# Patient Record
Sex: FEMALE
[De-identification: names, ages, dates, MRNs, and addresses within clinical notes are randomized per-mention and may not be internally consistent; named-entity substitution may affect disease eponyms.]

---

## 2019-03-14 ENCOUNTER — RESULT REVIEW (OUTPATIENT)
Age: 59
End: 2019-03-14

## 2021-02-17 ENCOUNTER — TRANSCRIPTION ENCOUNTER (OUTPATIENT)
Age: 61
End: 2021-02-17

## 2022-03-23 PROBLEM — Z00.00 ENCOUNTER FOR PREVENTIVE HEALTH EXAMINATION: Status: ACTIVE | Noted: 2022-03-23

## 2022-03-25 ENCOUNTER — APPOINTMENT (OUTPATIENT)
Dept: SURGERY | Facility: CLINIC | Age: 62
End: 2022-03-25
Payer: COMMERCIAL

## 2022-03-25 VITALS
BODY MASS INDEX: 32.39 KG/M2 | DIASTOLIC BLOOD PRESSURE: 77 MMHG | WEIGHT: 165 LBS | SYSTOLIC BLOOD PRESSURE: 120 MMHG | HEIGHT: 60 IN | OXYGEN SATURATION: 94 % | HEART RATE: 66 BPM | TEMPERATURE: 97.7 F

## 2022-03-25 DIAGNOSIS — Z78.9 OTHER SPECIFIED HEALTH STATUS: ICD-10-CM

## 2022-03-25 DIAGNOSIS — Z86.39 PERSONAL HISTORY OF OTHER ENDOCRINE, NUTRITIONAL AND METABOLIC DISEASE: ICD-10-CM

## 2022-03-25 DIAGNOSIS — Z83.3 FAMILY HISTORY OF DIABETES MELLITUS: ICD-10-CM

## 2022-03-25 DIAGNOSIS — D17.22 BENIGN LIPOMATOUS NEOPLASM OF SKIN AND SUBCUTANEOUS TISSUE OF LEFT ARM: ICD-10-CM

## 2022-03-25 PROCEDURE — 99203 OFFICE O/P NEW LOW 30 MIN: CPT

## 2022-03-25 NOTE — CONSULT LETTER
[Dear  ___] : Dear  [unfilled], [Consult Letter:] : I had the pleasure of evaluating your patient, [unfilled]. [Please see my note below.] : Please see my note below. [Consult Closing:] : Thank you very much for allowing me to participate in the care of this patient.  If you have any questions, please do not hesitate to contact me. [Sincerely,] : Sincerely, [FreeTextEntry3] : Esteban Carson MD, FACS\par  \par Department of Surgery\par [facility]\par [facility]\par

## 2022-03-25 NOTE — ASSESSMENT
[FreeTextEntry1] : The patient is a 61 year old female with a left shoulder lipoma.  The patient has been advised that the only way to remedy the mass is surgical excision.  The risks, benefits, and alternatives including the option of doing nothing to excision of a left shoulder lipoma were discussed.  The potential complications including but not limited to infection, bleeding, wound dehiscence, and possible recurrence of the lipoma were discussed.  The patient understands and wishes to proceed conservatively at this time.  She will follow up as needed from this point forward.  A total of 40 minutes was spent coordinating the care of the patient. \par \par \par \par

## 2022-03-25 NOTE — PHYSICAL EXAM
[JVD] : no jugular venous distention  [Purpura] : no purpura  [Petechiae] : no petechiae [Skin Ulcer] : no ulcer [Skin Induration] : no induration [Alert] : alert [Oriented to Place] : oriented to place [Oriented to Person] : oriented to person [Oriented to Time] : oriented to time [Calm] : calm [de-identified] : non toxic, in no acute distress  [de-identified] : NC/AT PERRL EOMI no scleral icterus  [de-identified] : trachea midline,  [de-identified] : non distended  [de-identified] : no audible wheezing or stridor  [de-identified] : FROM of all extremities with no gross deformity or angulation  [de-identified] : there is a 4 by 5  cm soft rubbery semi mobile mass of the left shoulder consistent with a lipoma  [de-identified] : mood is calm

## 2022-03-25 NOTE — HISTORY OF PRESENT ILLNESS
[de-identified] : The patient comes to the office in consultation by Dr. Wagner Lance for evaluation of a lump of the left shoulder.  The patient states that the lump has been getting larger over the past 15 years.  She reports that she only has discomfort when she leans against it.  Otherwise she has no pain and no limitations to movement.